# Patient Record
Sex: MALE | URBAN - METROPOLITAN AREA
[De-identification: names, ages, dates, MRNs, and addresses within clinical notes are randomized per-mention and may not be internally consistent; named-entity substitution may affect disease eponyms.]

---

## 2021-05-19 ENCOUNTER — APPOINTMENT (OUTPATIENT)
Dept: URBAN - METROPOLITAN AREA CLINIC 252 | Age: 71
Setting detail: DERMATOLOGY
End: 2021-05-19

## 2021-05-19 VITALS — RESPIRATION RATE: 16 BRPM | WEIGHT: 220 LBS | HEIGHT: 74 IN

## 2021-05-19 DIAGNOSIS — M71 OTHER BURSOPATHIES: ICD-10-CM

## 2021-05-19 PROBLEM — M71.341 OTHER BURSAL CYST, RIGHT HAND: Status: ACTIVE | Noted: 2021-05-19

## 2021-05-19 PROCEDURE — 99202 OFFICE O/P NEW SF 15 MIN: CPT

## 2021-05-19 PROCEDURE — OTHER COUNSELING: OTHER

## 2021-05-19 ASSESSMENT — LOCATION DETAILED DESCRIPTION DERM: LOCATION DETAILED: RIGHT SMALL DISTAL INTERPHALANGEAL JOINT

## 2021-05-19 ASSESSMENT — LOCATION SIMPLE DESCRIPTION DERM: LOCATION SIMPLE: RIGHT SMALL FINGER

## 2021-05-19 ASSESSMENT — LOCATION ZONE DERM: LOCATION ZONE: FINGER

## 2021-05-19 NOTE — PROCEDURE: COUNSELING
Detail Level: Detailed
Patient Specific Counseling (Will Not Stick From Patient To Patient): - Patient will follow-up with a [facility] for treatment [facility] as he is self pay here but can be seen [facility] the [facility]. Cost is a concern for him so he wishes to have treatment int [facility] setting.

## 2021-05-19 NOTE — HPI: SKIN LESION
How Severe Is Your Skin Lesion?: mild
Has Your Skin Lesion Been Treated?: not been treated
Is This A New Presentation, Or A Follow-Up?: Skin Lesion
Additional History: Makes tenderness with the groove.

## 2021-07-09 ENCOUNTER — APPOINTMENT (OUTPATIENT)
Dept: URBAN - METROPOLITAN AREA CLINIC 252 | Age: 71
Setting detail: DERMATOLOGY
End: 2021-07-09

## 2021-07-09 VITALS — RESPIRATION RATE: 14 BRPM | HEIGHT: 71 IN | WEIGHT: 122 LBS

## 2021-07-09 DIAGNOSIS — M71 OTHER BURSOPATHIES: ICD-10-CM

## 2021-07-09 PROBLEM — M71.341 OTHER BURSAL CYST, RIGHT HAND: Status: ACTIVE | Noted: 2021-07-09

## 2021-07-09 PROCEDURE — OTHER COUNSELING: OTHER

## 2021-07-09 PROCEDURE — OTHER INCISION AND DRAINAGE: OTHER

## 2021-07-09 PROCEDURE — 10060 I&D ABSCESS SIMPLE/SINGLE: CPT

## 2021-07-09 PROCEDURE — OTHER ADDITIONAL NOTES: OTHER

## 2021-07-09 ASSESSMENT — LOCATION ZONE DERM: LOCATION ZONE: FINGERNAIL

## 2021-07-09 ASSESSMENT — LOCATION SIMPLE DESCRIPTION DERM: LOCATION SIMPLE: RIGHT SMALL FINGER

## 2021-07-09 ASSESSMENT — LOCATION DETAILED DESCRIPTION DERM: LOCATION DETAILED: RIGHT SMALL FINGERNAIL

## 2021-07-09 NOTE — HPI: SKIN LESION
Is This A New Presentation, Or A Follow-Up?: Skin Lesion
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 Costs discussed prior to procedure today. Cost will be $279.02.

## 2021-07-09 NOTE — PROCEDURE: INCISION AND DRAINAGE
Anesthesia Volume In Cc: 0.3
Preparation Text: The area was prepped in the usual clean fashion.
Dressing: pressure dressing with telfa
Method: 11 blade
Anesthesia Type: 1% Xylocaine with epinephrine
Include Sutures?: No
Size Of Lesion In Cm (Optional But May Be Required For Some Insurances): 0
Lesion Type: Cyst
Consent: - Verbal and written informed consent was obtained and risks were reviewed including but not limited to delayed wound healing, infection, need for multiple I&D's, pain, and scarring.
Curette Text (Optional): After the contents were expressed a curette was used to partially remove the cyst wall.
Detail Level: Detailed
Post-Care Instructions: - Reviewed with the patient in detail post-care instructions. \\n- Patient should keep wound covered and call the office should any redness, pain, swelling or worsening occur.\\n- after drainage electrocautery was used for hemostasis and the site was treated with cryosurgery. Verbal informed consent was obtained today. No written informed consent was obtained in order to curtail any risk of Covid-19 and influenza transmission.
Suture Text: The incision was partially closed with
Epidermal Sutures: 4-0 Ethilon
Render Postcare In Note?: Yes
Epidermal Closure: simple interrupted

## 2021-07-09 NOTE — PROCEDURE: ADDITIONAL NOTES
Additional Notes: - Advised that myxoid cysts can be treated if desired.\\n- Patient expressed desire for treatment today.\\n- Discussed and recommended destruction via incision and drainage followed by liquid nitrogen cryosurgery.\\n- Discussed these in some cases will recur. \\n- If after multiple attempts for treatment it continues to recur then referral to an orthopedic hand surgeon may become necessary.  \\n- Patient expressed understanding.
Detail Level: Zone

## 2021-12-23 ENCOUNTER — APPOINTMENT (OUTPATIENT)
Dept: URBAN - METROPOLITAN AREA CLINIC 252 | Age: 71
Setting detail: DERMATOLOGY
End: 2021-12-23

## 2021-12-23 VITALS — WEIGHT: 225 LBS | HEIGHT: 71 IN | RESPIRATION RATE: 16 BRPM

## 2021-12-23 DIAGNOSIS — M71 OTHER BURSOPATHIES: ICD-10-CM

## 2021-12-23 PROBLEM — M71.341 OTHER BURSAL CYST, RIGHT HAND: Status: ACTIVE | Noted: 2021-12-23

## 2021-12-23 PROCEDURE — OTHER INCISION AND DRAINAGE: OTHER

## 2021-12-23 PROCEDURE — 10060 I&D ABSCESS SIMPLE/SINGLE: CPT

## 2021-12-23 PROCEDURE — OTHER COUNSELING: OTHER

## 2021-12-23 PROCEDURE — OTHER ADDITIONAL NOTES: OTHER

## 2021-12-23 ASSESSMENT — LOCATION ZONE DERM: LOCATION ZONE: FINGERNAIL

## 2021-12-23 ASSESSMENT — LOCATION SIMPLE DESCRIPTION DERM: LOCATION SIMPLE: RIGHT SMALL FINGER

## 2021-12-23 ASSESSMENT — LOCATION DETAILED DESCRIPTION DERM: LOCATION DETAILED: RIGHT SMALL FINGERNAIL

## 2021-12-23 NOTE — PROCEDURE: INCISION AND DRAINAGE
Epidermal Closure: simple interrupted
Render Postcare In Note?: Yes
Include Sutures?: No
Lesion Type: Cyst
Curette Text (Optional): After the contents were expressed a curette was used to partially remove the cyst wall.
Anesthesia Type: 1% Xylocaine with epinephrine
Dressing: pressure dressing with telfa
Preparation Text: The area was prepped in the usual clean fashion.
Detail Level: Detailed
Method: 11 blade
Consent: - Verbal and written informed consent was obtained and risks were reviewed including but not limited to delayed wound healing, infection, need for multiple I&D's, pain, and scarring.
Anesthesia Volume In Cc: 0.3
Post-Care Instructions: - Reviewed with the patient in detail post-care instructions. \\n- Patient should keep wound covered and call the office should any redness, pain, swelling or worsening occur.\\n- after drainage electrocautery was used for hemostasis and the site was treated with cryosurgery. Verbal informed consent was obtained today. No written informed consent was obtained in order to curtail any risk of Covid-19 and influenza transmission.
Suture Text: The incision was partially closed with
Epidermal Sutures: 4-0 Ethilon
Size Of Lesion In Cm (Optional But May Be Required For Some Insurances): 0

## 2021-12-23 NOTE — HPI: SKIN LESION
Is This A New Presentation, Or A Follow-Up?: Skin Lesion
Additional History: Treated at last office visit with cryosurgery and incision and drainage.